# Patient Record
(demographics unavailable — no encounter records)

---

## 2024-12-09 NOTE — PHYSICAL EXAM
[de-identified] : POSTOP RIGHT KNEE EXAM Edema: mild well healing surgical incision without surrounding erythema/drainage  ROM 0-60 degrees of flexion Stable to varus/valgus stress Stable to Anterior/posterior drawer test  Neurovascular exam Motor function 5/5 distal lower extremity Sensation to light touch: intact Distal pulses: 2+  XR of the R knee today demonstrate TKA in place w/o signs of interval changes from postoperative XR

## 2024-12-09 NOTE — HISTORY OF PRESENT ILLNESS
[de-identified] : 08/12/2024 VINCENT 72 year F is here today for evaluation of bilateral knee. Patient has had chronic knee pain and had left knee replacement in November of 2023. Patient reports left knee still has some pain but also wants to discuss surgery on the right knee as well. Patient has never received injections in either knee, only surgery and PT.  hx of left knee arthroscopy PMH: DM2, HLD  09/16/2024: Patient is here to follow up on bilateral knee, reports she wants to perform right knee arthroplasty. Patient has been in PT.  12/09/2024: Patient is here for post op #1. Ambulating with walker. Taking oxy PRN.

## 2024-12-09 NOTE — ASSESSMENT
[FreeTextEntry1] : 72 year F with right knee OA and left knee TKA w/o signs of loosening Left knee progressing well, PS knee right knee consider surgery in november  Ariel HUSSEIN M.D. discussed the patients diagnosis and treatment options, non-surgical and surgical, with the patient and/or legal guardian including the potential benefits and complications of each. Potential complications of non-surgical treatments discussed include residual pain and/or disability, non-healing, progression of symptoms and/or disease. Potential complications of surgery discussed include infection, nerve injury, vascular injury, cartilage injury, ligament injury, tendon injury, muscle injury, skin injury, stiffness, instability, weakness, persistent pain, technical or hardware failure, need for additional surgery, re-injury, medical complication, anesthetic related complication, and/or death. All questions were answered. The patient and/or legal guardian has elected to proceed with surgery. Informed consent obtained for Right CHESTER TKA  fu in 1month, consider sx in nov                    Preoperative evaluation and testing as needed is advised within 30 days prior to the procedure.  09/16/2024 IAriel M.D. discussed the patients diagnosis and treatment options, non-surgical and surgical, with the patient and/or legal guardian including the potential benefits and complications of each. Potential complications of non-surgical treatments discussed include residual pain and/or disability, non-healing, progression of symptoms and/or disease. Potential complications of surgery discussed include infection, nerve injury, vascular injury, cartilage injury, ligament injury, tendon injury, muscle injury, skin injury, stiffness, instability, weakness, persistent pain, technical or hardware failure, need for additional surgery, re-injury, medical complication, anesthetic related complication, and/or death. All questions were answered. The patient and/or legal guardian has elected to proceed with surgery. Informed consent obtained for Right CHESTER TKA                     Preoperative evaluation and testing as needed is advised within 30 days prior to the procedure.   12/09/2024: 1st post op s/p R TKA Transition to outpatient PT ASA BID X 2 week  Follow up in one month  fu with ROM check

## 2024-12-09 NOTE — PHYSICAL EXAM
[de-identified] : POSTOP RIGHT KNEE EXAM Edema: mild well healing surgical incision without surrounding erythema/drainage  ROM 0-60 degrees of flexion Stable to varus/valgus stress Stable to Anterior/posterior drawer test  Neurovascular exam Motor function 5/5 distal lower extremity Sensation to light touch: intact Distal pulses: 2+  XR of the R knee today demonstrate TKA in place w/o signs of interval changes from postoperative XR

## 2024-12-09 NOTE — HISTORY OF PRESENT ILLNESS
[de-identified] : 08/12/2024 VINCENT 72 year F is here today for evaluation of bilateral knee. Patient has had chronic knee pain and had left knee replacement in November of 2023. Patient reports left knee still has some pain but also wants to discuss surgery on the right knee as well. Patient has never received injections in either knee, only surgery and PT.  hx of left knee arthroscopy PMH: DM2, HLD  09/16/2024: Patient is here to follow up on bilateral knee, reports she wants to perform right knee arthroplasty. Patient has been in PT.  12/09/2024: Patient is here for post op #1. Ambulating with walker. Taking oxy PRN.

## 2024-12-09 NOTE — IMAGING
[de-identified] : LEFT KNEE EXAM Alignment: Neutral Varus Valgus Effusion: None Atrophy: None                                                  Stable to Varus/valgus stress Posterior Drawer Test: negative Anterior Drawer Test: Negative Knee Extension/Flexion: 0 / 120  Medial/lateral compartments Medial joint line: No Tenderness Lateral joint line: No Tenderness Marybel test: negative  Patellofemoral joint Medial patellar facet: no tenderness Patellar grind: Negative  Tendons: Pes Anserine: No tenderness Gerdys Tubercle/ IT Band: No tenderness Quadriceps Tendon: No Tenderness patellar tendon: no Tenderness Tibial tubercle: not tenderness Calf: no Tenderness  Neurovascular exam Muscle strength: 5/5 Sensation to light touch: intact Distal pulses: 2+   RIGHT KNEE EXAM Alignment: Neutral  Effusion: None Atrophy: None                                                  Stable to Varus/valgus stress Posterior Drawer Test: negative Anterior Drawer Test: Negative Knee Extension/Flexion: 0 / 120  Medial/lateral compartments Medial joint line: POS Tenderness Lateral joint line: POS Tenderness Marybel test: negative  Patellofemoral joint Medial patellar facet: no tenderness Patellar grind: Negative  Tendons: Pes Anserine: No tenderness Gerdys Tubercle/ IT Band: No tenderness Quadriceps Tendon: No Tenderness patellar tendon: no Tenderness Tibial tubercle: not tenderness Calf: no Tenderness  Neurovascular exam Muscle strength: 5/5 Sensation to light touch: intact Distal pulses: 2+  [id number] Xrays of the Right Knee were taken demonstrating mild tricompartmental OA 2024 Xrays of the Left Knee were taken demonstrating TKA w/o signs of loosening

## 2024-12-09 NOTE — IMAGING
[de-identified] : LEFT KNEE EXAM Alignment: Neutral Varus Valgus Effusion: None Atrophy: None                                                  Stable to Varus/valgus stress Posterior Drawer Test: negative Anterior Drawer Test: Negative Knee Extension/Flexion: 0 / 120  Medial/lateral compartments Medial joint line: No Tenderness Lateral joint line: No Tenderness Marybel test: negative  Patellofemoral joint Medial patellar facet: no tenderness Patellar grind: Negative  Tendons: Pes Anserine: No tenderness Gerdys Tubercle/ IT Band: No tenderness Quadriceps Tendon: No Tenderness patellar tendon: no Tenderness Tibial tubercle: not tenderness Calf: no Tenderness  Neurovascular exam Muscle strength: 5/5 Sensation to light touch: intact Distal pulses: 2+   RIGHT KNEE EXAM Alignment: Neutral  Effusion: None Atrophy: None                                                  Stable to Varus/valgus stress Posterior Drawer Test: negative Anterior Drawer Test: Negative Knee Extension/Flexion: 0 / 120  Medial/lateral compartments Medial joint line: POS Tenderness Lateral joint line: POS Tenderness Marybel test: negative  Patellofemoral joint Medial patellar facet: no tenderness Patellar grind: Negative  Tendons: Pes Anserine: No tenderness Gerdys Tubercle/ IT Band: No tenderness Quadriceps Tendon: No Tenderness patellar tendon: no Tenderness Tibial tubercle: not tenderness Calf: no Tenderness  Neurovascular exam Muscle strength: 5/5 Sensation to light touch: intact Distal pulses: 2+  [id number] Xrays of the Right Knee were taken demonstrating mild tricompartmental OA 2024 Xrays of the Left Knee were taken demonstrating TKA w/o signs of loosening

## 2024-12-30 NOTE — PHYSICAL EXAM
[de-identified] : POSTOP RIGHT KNEE EXAM Edema: mild well healing surgical incision without surrounding erythema/drainage  ROM 5-85 degrees of flexion Stable to varus/valgus stress Stable to Anterior/posterior drawer test  Neurovascular exam Motor function 5/5 distal lower extremity Sensation to light touch: intact Distal pulses: 2+  XR of the R knee today demonstrate TKA in place w/o signs of interval changes from postoperative XR

## 2024-12-30 NOTE — IMAGING
[de-identified] : LEFT KNEE EXAM Alignment: Neutral Varus Valgus Effusion: None Atrophy: None                                                  Stable to Varus/valgus stress Posterior Drawer Test: negative Anterior Drawer Test: Negative Knee Extension/Flexion: 0 / 120  Medial/lateral compartments Medial joint line: No Tenderness Lateral joint line: No Tenderness Marybel test: negative  Patellofemoral joint Medial patellar facet: no tenderness Patellar grind: Negative  Tendons: Pes Anserine: No tenderness Gerdys Tubercle/ IT Band: No tenderness Quadriceps Tendon: No Tenderness patellar tendon: no Tenderness Tibial tubercle: not tenderness Calf: no Tenderness  Neurovascular exam Muscle strength: 5/5 Sensation to light touch: intact Distal pulses: 2+   RIGHT KNEE EXAM Alignment: Neutral  Effusion: None Atrophy: None                                                  Stable to Varus/valgus stress Posterior Drawer Test: negative Anterior Drawer Test: Negative Knee Extension/Flexion: 0 / 120  Medial/lateral compartments Medial joint line: POS Tenderness Lateral joint line: POS Tenderness Marybel test: negative  Patellofemoral joint Medial patellar facet: no tenderness Patellar grind: Negative  Tendons: Pes Anserine: No tenderness Gerdys Tubercle/ IT Band: No tenderness Quadriceps Tendon: No Tenderness patellar tendon: no Tenderness Tibial tubercle: not tenderness Calf: no Tenderness  Neurovascular exam Muscle strength: 5/5 Sensation to light touch: intact Distal pulses: 2+  [id number] Xrays of the Right Knee were taken demonstrating mild tricompartmental OA 2024 Xrays of the Left Knee were taken demonstrating TKA w/o signs of loosening

## 2024-12-30 NOTE — HISTORY OF PRESENT ILLNESS
[de-identified] : 08/12/2024 VINCENT 72 year F is here today for evaluation of bilateral knee. Patient has had chronic knee pain and had left knee replacement in November of 2023. Patient reports left knee still has some pain but also wants to discuss surgery on the right knee as well. Patient has never received injections in either knee, only surgery and PT.  hx of left knee arthroscopy PMH: DM2, HLD  09/16/2024: Patient is here to follow up on bilateral knee, reports she wants to perform right knee arthroplasty. Patient has been in PT.  12/09/2024: Patient is here for post op #1. Ambulating with walker. Taking oxy PRN.  12/30/2024: Patient is here today for post op visit #2. She is ambulating with a cane. Pain level (5/10) reports some stiffness on b/l knees. She is doing PT & HEP.

## 2024-12-30 NOTE — ASSESSMENT
[FreeTextEntry1] : 72 year F with right knee OA and left knee TKA w/o signs of loosening Left knee progressing well, PS knee right knee consider surgery in november  Ariel HUSSEIN M.D. discussed the patients diagnosis and treatment options, non-surgical and surgical, with the patient and/or legal guardian including the potential benefits and complications of each. Potential complications of non-surgical treatments discussed include residual pain and/or disability, non-healing, progression of symptoms and/or disease. Potential complications of surgery discussed include infection, nerve injury, vascular injury, cartilage injury, ligament injury, tendon injury, muscle injury, skin injury, stiffness, instability, weakness, persistent pain, technical or hardware failure, need for additional surgery, re-injury, medical complication, anesthetic related complication, and/or death. All questions were answered. The patient and/or legal guardian has elected to proceed with surgery. Informed consent obtained for Right CHESTER TKA  fu in 1month, consider sx in nov                    Preoperative evaluation and testing as needed is advised within 30 days prior to the procedure.  09/16/2024 IAriel M.D. discussed the patients diagnosis and treatment options, non-surgical and surgical, with the patient and/or legal guardian including the potential benefits and complications of each. Potential complications of non-surgical treatments discussed include residual pain and/or disability, non-healing, progression of symptoms and/or disease. Potential complications of surgery discussed include infection, nerve injury, vascular injury, cartilage injury, ligament injury, tendon injury, muscle injury, skin injury, stiffness, instability, weakness, persistent pain, technical or hardware failure, need for additional surgery, re-injury, medical complication, anesthetic related complication, and/or death. All questions were answered. The patient and/or legal guardian has elected to proceed with surgery. Informed consent obtained for Right CHESTER TKA                     Preoperative evaluation and testing as needed is advised within 30 days prior to the procedure.   12/09/2024: 1st post op s/p R TKA Transition to outpatient PT ASA BID X 2 week  Follow up in one month  fu with ROM check  12/30/2024  continue outpatient PT  6 weeks out ROM check, 1 month, AYANA if no improvement. MDP sent to pharmacy

## 2025-01-27 NOTE — IMAGING
[de-identified] : LEFT KNEE EXAM Alignment: Neutral Varus Valgus Effusion: None Atrophy: None                                                  Stable to Varus/valgus stress Posterior Drawer Test: negative Anterior Drawer Test: Negative Knee Extension/Flexion: 0 / 120  Medial/lateral compartments Medial joint line: No Tenderness Lateral joint line: No Tenderness Maryble test: negative  Patellofemoral joint Medial patellar facet: no tenderness Patellar grind: Negative  Tendons: Pes Anserine: No tenderness Gerdys Tubercle/ IT Band: No tenderness Quadriceps Tendon: No Tenderness patellar tendon: no Tenderness Tibial tubercle: not tenderness Calf: no Tenderness  Neurovascular exam Muscle strength: 5/5 Sensation to light touch: intact Distal pulses: 2+   RIGHT KNEE EXAM Alignment: Neutral  Effusion: None Atrophy: None                                                  Stable to Varus/valgus stress Posterior Drawer Test: negative Anterior Drawer Test: Negative Knee Extension/Flexion: 0 / 120  Medial/lateral compartments Medial joint line: POS Tenderness Lateral joint line: POS Tenderness Marybel test: negative  Patellofemoral joint Medial patellar facet: no tenderness Patellar grind: Negative  Tendons: Pes Anserine: No tenderness Gerdys Tubercle/ IT Band: No tenderness Quadriceps Tendon: No Tenderness patellar tendon: no Tenderness Tibial tubercle: not tenderness Calf: no Tenderness  Neurovascular exam Muscle strength: 5/5 Sensation to light touch: intact Distal pulses: 2+  [id number] Xrays of the Right Knee were taken demonstrating mild tricompartmental OA 2024 Xrays of the Left Knee were taken demonstrating TKA w/o signs of loosening

## 2025-01-27 NOTE — HISTORY OF PRESENT ILLNESS
[de-identified] : 08/12/2024 VINCENT 72 year F is here today for evaluation of bilateral knee. Patient has had chronic knee pain and had left knee replacement in November of 2023. Patient reports left knee still has some pain but also wants to discuss surgery on the right knee as well. Patient has never received injections in either knee, only surgery and PT.  hx of left knee arthroscopy PMH: DM2, HLD  09/16/2024: Patient is here to follow up on bilateral knee, reports she wants to perform right knee arthroplasty. Patient has been in PT.  12/09/2024: Patient is here for post op #1. Ambulating with walker. Taking oxy PRN.  12/30/2024: Patient is here today for post op visit #2. She is ambulating with a cane. Pain level (5/10) reports some stiffness on b/l knees. She is doing PT & HEP.   01/27/2025: patient is here today for pov#3. she is feeling okay some soreness when bending the knee. continues to ambulate with cane.

## 2025-01-27 NOTE — ASSESSMENT
[FreeTextEntry1] : 72 year F with right knee OA and left knee TKA w/o signs of loosening Left knee progressing well, PS knee right knee consider surgery in november  Ariel HUSSEIN M.D. discussed the patients diagnosis and treatment options, non-surgical and surgical, with the patient and/or legal guardian including the potential benefits and complications of each. Potential complications of non-surgical treatments discussed include residual pain and/or disability, non-healing, progression of symptoms and/or disease. Potential complications of surgery discussed include infection, nerve injury, vascular injury, cartilage injury, ligament injury, tendon injury, muscle injury, skin injury, stiffness, instability, weakness, persistent pain, technical or hardware failure, need for additional surgery, re-injury, medical complication, anesthetic related complication, and/or death. All questions were answered. The patient and/or legal guardian has elected to proceed with surgery. Informed consent obtained for Right CHESTER TKA  fu in 1month, consider sx in nov                    Preoperative evaluation and testing as needed is advised within 30 days prior to the procedure.  09/16/2024 IAriel M.D. discussed the patients diagnosis and treatment options, non-surgical and surgical, with the patient and/or legal guardian including the potential benefits and complications of each. Potential complications of non-surgical treatments discussed include residual pain and/or disability, non-healing, progression of symptoms and/or disease. Potential complications of surgery discussed include infection, nerve injury, vascular injury, cartilage injury, ligament injury, tendon injury, muscle injury, skin injury, stiffness, instability, weakness, persistent pain, technical or hardware failure, need for additional surgery, re-injury, medical complication, anesthetic related complication, and/or death. All questions were answered. The patient and/or legal guardian has elected to proceed with surgery. Informed consent obtained for Right CHESTER TKA                     Preoperative evaluation and testing as needed is advised within 30 days prior to the procedure.   12/09/2024: 1st post op s/p R TKA Transition to outpatient PT ASA BID X 2 week  Follow up in one month  fu with ROM check  12/30/2024  continue outpatient PT  6 weeks out ROM check, 1 month, AYANA if no improvement. MDP sent to pharmacy  01/27/2025  working with PT ROM limited 5-90 recommend manipulation of the right knee

## 2025-01-27 NOTE — PHYSICAL EXAM
[de-identified] : POSTOP RIGHT KNEE EXAM Edema: mild well healing surgical incision without surrounding erythema/drainage  ROM 5-85 degrees of flexion Stable to varus/valgus stress Stable to Anterior/posterior drawer test  Neurovascular exam Motor function 5/5 distal lower extremity Sensation to light touch: intact Distal pulses: 2+  XR of the R knee today demonstrate TKA in place w/o signs of interval changes from postoperative XR

## 2025-02-24 NOTE — ASSESSMENT
[FreeTextEntry1] : 72 year F with right knee OA and left knee TKA w/o signs of loosening Left knee progressing well, PS knee right knee consider surgery in november  Ariel HUSSEIN M.D. discussed the patients diagnosis and treatment options, non-surgical and surgical, with the patient and/or legal guardian including the potential benefits and complications of each. Potential complications of non-surgical treatments discussed include residual pain and/or disability, non-healing, progression of symptoms and/or disease. Potential complications of surgery discussed include infection, nerve injury, vascular injury, cartilage injury, ligament injury, tendon injury, muscle injury, skin injury, stiffness, instability, weakness, persistent pain, technical or hardware failure, need for additional surgery, re-injury, medical complication, anesthetic related complication, and/or death. All questions were answered. The patient and/or legal guardian has elected to proceed with surgery. Informed consent obtained for Right CHESTER TKA  fu in 1month, consider sx in nov                    Preoperative evaluation and testing as needed is advised within 30 days prior to the procedure.  09/16/2024 IAriel M.D. discussed the patients diagnosis and treatment options, non-surgical and surgical, with the patient and/or legal guardian including the potential benefits and complications of each. Potential complications of non-surgical treatments discussed include residual pain and/or disability, non-healing, progression of symptoms and/or disease. Potential complications of surgery discussed include infection, nerve injury, vascular injury, cartilage injury, ligament injury, tendon injury, muscle injury, skin injury, stiffness, instability, weakness, persistent pain, technical or hardware failure, need for additional surgery, re-injury, medical complication, anesthetic related complication, and/or death. All questions were answered. The patient and/or legal guardian has elected to proceed with surgery. Informed consent obtained for Right CHESTER TKA                     Preoperative evaluation and testing as needed is advised within 30 days prior to the procedure.   12/09/2024: 1st post op s/p R TKA Transition to outpatient PT ASA BID X 2 week  Follow up in one month  fu with ROM check  12/30/2024  continue outpatient PT  6 weeks out ROM check, 1 month, AYANA if no improvement. MDP sent to pharmacy  01/27/2025  working with PT ROM limited 5-90 recommend manipulation of the right knee   02/24/2025  continued working with PT ROM improvement 1 month fu

## 2025-02-24 NOTE — IMAGING
[de-identified] : LEFT KNEE EXAM Alignment: Neutral Varus Valgus Effusion: None Atrophy: None                                                  Stable to Varus/valgus stress Posterior Drawer Test: negative Anterior Drawer Test: Negative Knee Extension/Flexion: 0 / 120  Medial/lateral compartments Medial joint line: No Tenderness Lateral joint line: No Tenderness Marybel test: negative  Patellofemoral joint Medial patellar facet: no tenderness Patellar grind: Negative  Tendons: Pes Anserine: No tenderness Gerdys Tubercle/ IT Band: No tenderness Quadriceps Tendon: No Tenderness patellar tendon: no Tenderness Tibial tubercle: not tenderness Calf: no Tenderness  Neurovascular exam Muscle strength: 5/5 Sensation to light touch: intact Distal pulses: 2+   RIGHT KNEE EXAM Alignment: Neutral  Effusion: None Atrophy: None                                                  Stable to Varus/valgus stress Posterior Drawer Test: negative Anterior Drawer Test: Negative Knee Extension/Flexion: 0 / 120  Medial/lateral compartments Medial joint line: POS Tenderness Lateral joint line: POS Tenderness Marybel test: negative  Patellofemoral joint Medial patellar facet: no tenderness Patellar grind: Negative  Tendons: Pes Anserine: No tenderness Gerdys Tubercle/ IT Band: No tenderness Quadriceps Tendon: No Tenderness patellar tendon: no Tenderness Tibial tubercle: not tenderness Calf: no Tenderness  Neurovascular exam Muscle strength: 5/5 Sensation to light touch: intact Distal pulses: 2+  [id number] Xrays of the Right Knee were taken demonstrating mild tricompartmental OA 2024 Xrays of the Left Knee were taken demonstrating TKA w/o signs of loosening

## 2025-02-24 NOTE — HISTORY OF PRESENT ILLNESS
[de-identified] : 08/12/2024 VINCENT 72 year F is here today for evaluation of bilateral knee. Patient has had chronic knee pain and had left knee replacement in November of 2023. Patient reports left knee still has some pain but also wants to discuss surgery on the right knee as well. Patient has never received injections in either knee, only surgery and PT.  hx of left knee arthroscopy PMH: DM2, HLD  09/16/2024: Patient is here to follow up on bilateral knee, reports she wants to perform right knee arthroplasty. Patient has been in PT.  12/09/2024: Patient is here for post op #1. Ambulating with walker. Taking oxy PRN.  12/30/2024: Patient is here today for post op visit #2. She is ambulating with a cane. Pain level (5/10) reports some stiffness on b/l knees. She is doing PT & HEP.   01/27/2025: patient is here today for pov#3. she is feeling okay some soreness when bending the knee. continues to ambulate with cane.   02/24/2025: patient is today for Pov #1 of right knee. she notes improvement since surgery, complains of some soreness. continues to ambulate with cane

## 2025-02-24 NOTE — PHYSICAL EXAM
[de-identified] : POSTOP RIGHT KNEE EXAM Edema: mild well healing surgical incision without surrounding erythema/drainage  ROM 0-95 degrees of flexion Stable to varus/valgus stress Stable to Anterior/posterior drawer test  Neurovascular exam Motor function 5/5 distal lower extremity Sensation to light touch: intact Distal pulses: 2+  XR of the R knee today demonstrate TKA in place w/o signs of interval changes from postoperative XR

## 2025-03-24 NOTE — HISTORY OF PRESENT ILLNESS
[de-identified] : 08/12/2024 VINCENT 72 year F is here today for evaluation of bilateral knee. Patient has had chronic knee pain and had left knee replacement in November of 2023. Patient reports left knee still has some pain but also wants to discuss surgery on the right knee as well. Patient has never received injections in either knee, only surgery and PT.  hx of left knee arthroscopy PMH: DM2, HLD  09/16/2024: Patient is here to follow up on bilateral knee, reports she wants to perform right knee arthroplasty. Patient has been in PT.  12/09/2024: Patient is here for post op #1. Ambulating with walker. Taking oxy PRN.  12/30/2024: Patient is here today for post op visit #2. She is ambulating with a cane. Pain level (5/10) reports some stiffness on b/l knees. She is doing PT & HEP.   01/27/2025: patient is here today for pov#3. she is feeling okay some soreness when bending the knee. continues to ambulate with cane.   02/24/2025: patient is today for Pov #1 of right knee. she notes improvement since surgery, complains of some soreness. continues to ambulate with cane   03/24/2025: patient is here today to for po#2. she notes feeling okay ambulating with cane. Still in PT, taking medication for pain.

## 2025-03-24 NOTE — IMAGING
[de-identified] : LEFT KNEE EXAM Alignment: Neutral Varus Valgus Effusion: None Atrophy: None                                                  Stable to Varus/valgus stress Posterior Drawer Test: negative Anterior Drawer Test: Negative Knee Extension/Flexion: 0 / 120  Medial/lateral compartments Medial joint line: No Tenderness Lateral joint line: No Tenderness Marybel test: negative  Patellofemoral joint Medial patellar facet: no tenderness Patellar grind: Negative  Tendons: Pes Anserine: No tenderness Gerdys Tubercle/ IT Band: No tenderness Quadriceps Tendon: No Tenderness patellar tendon: no Tenderness Tibial tubercle: not tenderness Calf: no Tenderness  Neurovascular exam Muscle strength: 5/5 Sensation to light touch: intact Distal pulses: 2+   RIGHT KNEE EXAM Alignment: Neutral  Effusion: None Atrophy: None                                                  Stable to Varus/valgus stress Posterior Drawer Test: negative Anterior Drawer Test: Negative Knee Extension/Flexion: 0 / 120  Medial/lateral compartments Medial joint line: POS Tenderness Lateral joint line: POS Tenderness Marybel test: negative  Patellofemoral joint Medial patellar facet: no tenderness Patellar grind: Negative  Tendons: Pes Anserine: No tenderness Gerdys Tubercle/ IT Band: No tenderness Quadriceps Tendon: No Tenderness patellar tendon: no Tenderness Tibial tubercle: not tenderness Calf: no Tenderness  Neurovascular exam Muscle strength: 5/5 Sensation to light touch: intact Distal pulses: 2+  [id number] Xrays of the Right Knee were taken demonstrating mild tricompartmental OA 2024 Xrays of the Left Knee were taken demonstrating TKA w/o signs of loosening

## 2025-03-24 NOTE — ASSESSMENT
[FreeTextEntry1] : 72 year F with right knee OA and left knee TKA w/o signs of loosening Left knee progressing well, PS knee right knee consider surgery in november  Ariel HUSSEIN M.D. discussed the patients diagnosis and treatment options, non-surgical and surgical, with the patient and/or legal guardian including the potential benefits and complications of each. Potential complications of non-surgical treatments discussed include residual pain and/or disability, non-healing, progression of symptoms and/or disease. Potential complications of surgery discussed include infection, nerve injury, vascular injury, cartilage injury, ligament injury, tendon injury, muscle injury, skin injury, stiffness, instability, weakness, persistent pain, technical or hardware failure, need for additional surgery, re-injury, medical complication, anesthetic related complication, and/or death. All questions were answered. The patient and/or legal guardian has elected to proceed with surgery. Informed consent obtained for Right CHESTER TKA  fu in 1month, consider sx in nov                    Preoperative evaluation and testing as needed is advised within 30 days prior to the procedure.  09/16/2024 IAriel M.D. discussed the patients diagnosis and treatment options, non-surgical and surgical, with the patient and/or legal guardian including the potential benefits and complications of each. Potential complications of non-surgical treatments discussed include residual pain and/or disability, non-healing, progression of symptoms and/or disease. Potential complications of surgery discussed include infection, nerve injury, vascular injury, cartilage injury, ligament injury, tendon injury, muscle injury, skin injury, stiffness, instability, weakness, persistent pain, technical or hardware failure, need for additional surgery, re-injury, medical complication, anesthetic related complication, and/or death. All questions were answered. The patient and/or legal guardian has elected to proceed with surgery. Informed consent obtained for Right CHESTER TKA                     Preoperative evaluation and testing as needed is advised within 30 days prior to the procedure.   12/09/2024: 1st post op s/p R TKA Transition to outpatient PT ASA BID X 2 week  Follow up in one month  fu with ROM check  12/30/2024  continue outpatient PT  6 weeks out ROM check, 1 month, AYANA if no improvement. MDP sent to pharmacy  01/27/2025  working with PT ROM limited 5-90 recommend manipulation of the right knee   02/24/2025  continued working with PT ROM improvement 1 month fu  03/24/2025: 1 month kelley PIÑA, doing well  ROM Improvement PT renewed 6 week follow up

## 2025-03-24 NOTE — PHYSICAL EXAM
[de-identified] : POSTOP RIGHT KNEE EXAM Edema: mild well healing surgical incision without surrounding erythema/drainage  ROM 0-110 degrees of flexion Stable to varus/valgus stress Stable to Anterior/posterior drawer test  Neurovascular exam Motor function 5/5 distal lower extremity Sensation to light touch: intact Distal pulses: 2+  XR of the R knee today demonstrate TKA in place w/o signs of interval changes from postoperative XR

## 2025-05-05 NOTE — HISTORY OF PRESENT ILLNESS
[de-identified] : 08/12/2024 VINCENT 72 year F is here today for evaluation of bilateral knee. Patient has had chronic knee pain and had left knee replacement in November of 2023. Patient reports left knee still has some pain but also wants to discuss surgery on the right knee as well. Patient has never received injections in either knee, only surgery and PT.  hx of left knee arthroscopy PMH: DM2, HLD  09/16/2024: Patient is here to follow up on bilateral knee, reports she wants to perform right knee arthroplasty. Patient has been in PT.  12/09/2024: Patient is here for post op #1. Ambulating with walker. Taking oxy PRN.  12/30/2024: Patient is here today for post op visit #2. She is ambulating with a cane. Pain level (5/10) reports some stiffness on b/l knees. She is doing PT & HEP.   01/27/2025: patient is here today for pov#3. she is feeling okay some soreness when bending the knee. continues to ambulate with cane.   02/24/2025: patient is today for Pov #1 of right knee. she notes improvement since surgery, complains of some soreness. continues to ambulate with cane   03/24/2025: patient is here today to for po#2. she notes feeling okay ambulating with cane. Still in PT, taking medication for pain.   5/5/25 patient here for PO# 3 . states she is doing well. has mild stiffness. continues PT 2 times a week. ambulating with a cane

## 2025-05-05 NOTE — PHYSICAL EXAM
[de-identified] : POSTOP RIGHT KNEE EXAM Edema: mild well healing surgical incision without surrounding erythema/drainage  ROM 0-120 degrees of flexion Stable to varus/valgus stress Stable to Anterior/posterior drawer test  Neurovascular exam Motor function 5/5 distal lower extremity Sensation to light touch: intact Distal pulses: 2+  XR of the R knee today demonstrate TKA in place w/o signs of interval changes from postoperative XR

## 2025-05-05 NOTE — ASSESSMENT
[FreeTextEntry1] : 72 year F with right knee OA and left knee TKA w/o signs of loosening Left knee progressing well, PS knee right knee consider surgery in november  Ariel HUSSEIN M.D. discussed the patients diagnosis and treatment options, non-surgical and surgical, with the patient and/or legal guardian including the potential benefits and complications of each. Potential complications of non-surgical treatments discussed include residual pain and/or disability, non-healing, progression of symptoms and/or disease. Potential complications of surgery discussed include infection, nerve injury, vascular injury, cartilage injury, ligament injury, tendon injury, muscle injury, skin injury, stiffness, instability, weakness, persistent pain, technical or hardware failure, need for additional surgery, re-injury, medical complication, anesthetic related complication, and/or death. All questions were answered. The patient and/or legal guardian has elected to proceed with surgery. Informed consent obtained for Right CHESTER TKA  fu in 1month, consider sx in nov                    Preoperative evaluation and testing as needed is advised within 30 days prior to the procedure.  09/16/2024 IAriel M.D. discussed the patients diagnosis and treatment options, non-surgical and surgical, with the patient and/or legal guardian including the potential benefits and complications of each. Potential complications of non-surgical treatments discussed include residual pain and/or disability, non-healing, progression of symptoms and/or disease. Potential complications of surgery discussed include infection, nerve injury, vascular injury, cartilage injury, ligament injury, tendon injury, muscle injury, skin injury, stiffness, instability, weakness, persistent pain, technical or hardware failure, need for additional surgery, re-injury, medical complication, anesthetic related complication, and/or death. All questions were answered. The patient and/or legal guardian has elected to proceed with surgery. Informed consent obtained for Right CHESTER TKA                     Preoperative evaluation and testing as needed is advised within 30 days prior to the procedure.   12/09/2024: 1st post op s/p R TKA Transition to outpatient PT ASA BID X 2 week  Follow up in one month  fu with ROM check  12/30/2024  continue outpatient PT  6 weeks out ROM check, 1 month, AYANA if no improvement. MDP sent to pharmacy  01/27/2025  working with PT ROM limited 5-90 recommend manipulation of the right knee   02/24/2025  continued working with PT ROM improvement 1 month fu  03/24/2025: 1 month s.p AYANA, doing well  ROM Improvement PT renewed 6 week follow up  05/05/2025: 3 months s/p R AYANA, significant improvement PT renewed Reminded about dental abx ppx Follow up in Nov for annual visit

## 2025-05-05 NOTE — IMAGING
[de-identified] : LEFT KNEE EXAM Alignment: Neutral Varus Valgus Effusion: None Atrophy: None                                                  Stable to Varus/valgus stress Posterior Drawer Test: negative Anterior Drawer Test: Negative Knee Extension/Flexion: 0 / 120  Medial/lateral compartments Medial joint line: No Tenderness Lateral joint line: No Tenderness Marybel test: negative  Patellofemoral joint Medial patellar facet: no tenderness Patellar grind: Negative  Tendons: Pes Anserine: No tenderness Gerdys Tubercle/ IT Band: No tenderness Quadriceps Tendon: No Tenderness patellar tendon: no Tenderness Tibial tubercle: not tenderness Calf: no Tenderness  Neurovascular exam Muscle strength: 5/5 Sensation to light touch: intact Distal pulses: 2+   RIGHT KNEE EXAM Alignment: Neutral  Effusion: None Atrophy: None                                                  Stable to Varus/valgus stress Posterior Drawer Test: negative Anterior Drawer Test: Negative Knee Extension/Flexion: 0 / 120  Medial/lateral compartments Medial joint line: POS Tenderness Lateral joint line: POS Tenderness Marybel test: negative  Patellofemoral joint Medial patellar facet: no tenderness Patellar grind: Negative  Tendons: Pes Anserine: No tenderness Gerdys Tubercle/ IT Band: No tenderness Quadriceps Tendon: No Tenderness patellar tendon: no Tenderness Tibial tubercle: not tenderness Calf: no Tenderness  Neurovascular exam Muscle strength: 5/5 Sensation to light touch: intact Distal pulses: 2+  [id number] Xrays of the Right Knee were taken demonstrating mild tricompartmental OA 2024 Xrays of the Left Knee were taken demonstrating TKA w/o signs of loosening